# Patient Record
Sex: FEMALE | Race: WHITE | NOT HISPANIC OR LATINO | RURAL
[De-identification: names, ages, dates, MRNs, and addresses within clinical notes are randomized per-mention and may not be internally consistent; named-entity substitution may affect disease eponyms.]

---

## 2024-03-30 ENCOUNTER — HOSPITAL ENCOUNTER (EMERGENCY)
Facility: HOSPITAL | Age: 29
Discharge: HOME | End: 2024-03-30
Attending: EMERGENCY MEDICINE
Payer: COMMERCIAL

## 2024-03-30 VITALS
TEMPERATURE: 98 F | WEIGHT: 160 LBS | BODY MASS INDEX: 28.35 KG/M2 | HEIGHT: 63 IN | DIASTOLIC BLOOD PRESSURE: 76 MMHG | HEART RATE: 84 BPM | OXYGEN SATURATION: 98 % | SYSTOLIC BLOOD PRESSURE: 123 MMHG | RESPIRATION RATE: 12 BRPM

## 2024-03-30 DIAGNOSIS — R55 SYNCOPE: ICD-10-CM

## 2024-03-30 DIAGNOSIS — R53.83 EXHAUSTION: Primary | ICD-10-CM

## 2024-03-30 LAB
ALBUMIN SERPL BCP-MCNC: 4.1 G/DL (ref 3.5–5)
ALBUMIN/GLOB SERPL: 1.3 {RATIO}
ALP SERPL-CCNC: 54 U/L (ref 37–98)
ALT SERPL W P-5'-P-CCNC: 31 U/L (ref 13–56)
AMPHET UR QL SCN: NEGATIVE
ANION GAP SERPL CALCULATED.3IONS-SCNC: 10 MMOL/L (ref 7–16)
AST SERPL W P-5'-P-CCNC: 16 U/L (ref 15–37)
BARBITURATES UR QL SCN: NEGATIVE
BASOPHILS # BLD AUTO: 0.05 K/UL (ref 0–0.2)
BASOPHILS NFR BLD AUTO: 0.5 % (ref 0–1)
BENZODIAZ METAB UR QL SCN: NEGATIVE
BILIRUB SERPL-MCNC: 1.2 MG/DL (ref ?–1.2)
BILIRUB UR QL STRIP: NEGATIVE
BUN SERPL-MCNC: 6 MG/DL (ref 7–18)
BUN/CREAT SERPL: 9 (ref 6–20)
CALCIUM SERPL-MCNC: 9 MG/DL (ref 8.5–10.1)
CANNABINOIDS UR QL SCN: NEGATIVE
CHLORIDE SERPL-SCNC: 108 MMOL/L (ref 98–107)
CLARITY UR: CLEAR
CO2 SERPL-SCNC: 26 MMOL/L (ref 21–32)
COCAINE UR QL SCN: NEGATIVE
COLOR UR: COLORLESS
CREAT SERPL-MCNC: 0.64 MG/DL (ref 0.55–1.02)
D DIMER PPP FEU-MCNC: <0.27 ΜG/ML (ref 0–0.47)
DIFFERENTIAL METHOD BLD: ABNORMAL
EGFR (NO RACE VARIABLE) (RUSH/TITUS): 125 ML/MIN/1.73M2
EOSINOPHIL # BLD AUTO: 0.1 K/UL (ref 0–0.5)
EOSINOPHIL NFR BLD AUTO: 1 % (ref 1–4)
ERYTHROCYTE [DISTWIDTH] IN BLOOD BY AUTOMATED COUNT: 11.9 % (ref 11.5–14.5)
GLOBULIN SER-MCNC: 3.2 G/DL (ref 2–4)
GLUCOSE SERPL-MCNC: 88 MG/DL (ref 70–105)
GLUCOSE SERPL-MCNC: 98 MG/DL (ref 74–106)
GLUCOSE UR STRIP-MCNC: NORMAL MG/DL
HCT VFR BLD AUTO: 42.6 % (ref 38–47)
HGB BLD-MCNC: 14.1 G/DL (ref 12–16)
IMM GRANULOCYTES # BLD AUTO: 0.03 K/UL (ref 0–0.04)
IMM GRANULOCYTES NFR BLD: 0.3 % (ref 0–0.4)
KETONES UR STRIP-SCNC: NEGATIVE MG/DL
LEUKOCYTE ESTERASE UR QL STRIP: ABNORMAL
LYMPHOCYTES # BLD AUTO: 2.58 K/UL (ref 1–4.8)
LYMPHOCYTES NFR BLD AUTO: 25.5 % (ref 27–41)
MAGNESIUM SERPL-MCNC: 2.2 MG/DL (ref 1.7–2.3)
MCH RBC QN AUTO: 30.4 PG (ref 27–31)
MCHC RBC AUTO-ENTMCNC: 33.1 G/DL (ref 32–36)
MCV RBC AUTO: 91.8 FL (ref 80–96)
MONOCYTES # BLD AUTO: 0.51 K/UL (ref 0–0.8)
MONOCYTES NFR BLD AUTO: 5 % (ref 2–6)
MPC BLD CALC-MCNC: 10.6 FL (ref 9.4–12.4)
MUCOUS, UA: ABNORMAL /LPF
NEUTROPHILS # BLD AUTO: 6.83 K/UL (ref 1.8–7.7)
NEUTROPHILS NFR BLD AUTO: 67.7 % (ref 53–65)
NITRITE UR QL STRIP: NEGATIVE
NRBC # BLD AUTO: 0 X10E3/UL
NRBC, AUTO (.00): 0 %
NT-PROBNP SERPL-MCNC: 80 PG/ML (ref 1–125)
OPIATES UR QL SCN: NEGATIVE
PCP UR QL SCN: NEGATIVE
PH UR STRIP: 7 PH UNITS
PLATELET # BLD AUTO: 249 K/UL (ref 150–400)
POTASSIUM SERPL-SCNC: 3.5 MMOL/L (ref 3.5–5.1)
PROT SERPL-MCNC: 7.3 G/DL (ref 6.4–8.2)
PROT UR QL STRIP: NEGATIVE
RBC # BLD AUTO: 4.64 M/UL (ref 4.2–5.4)
RBC # UR STRIP: ABNORMAL /UL
RBC #/AREA URNS HPF: 1 /HPF
SODIUM SERPL-SCNC: 140 MMOL/L (ref 136–145)
SP GR UR STRIP: 1.01
SQUAMOUS #/AREA URNS LPF: ABNORMAL /HPF
TROPONIN I SERPL DL<=0.01 NG/ML-MCNC: <4 PG/ML
UROBILINOGEN UR STRIP-ACNC: NORMAL MG/DL
WBC # BLD AUTO: 10.1 K/UL (ref 4.5–11)
WBC #/AREA URNS HPF: 2 /HPF

## 2024-03-30 PROCEDURE — 85025 COMPLETE CBC W/AUTO DIFF WBC: CPT | Performed by: EMERGENCY MEDICINE

## 2024-03-30 PROCEDURE — 80307 DRUG TEST PRSMV CHEM ANLYZR: CPT | Performed by: EMERGENCY MEDICINE

## 2024-03-30 PROCEDURE — 93010 ELECTROCARDIOGRAM REPORT: CPT | Mod: ,,, | Performed by: HOSPITALIST

## 2024-03-30 PROCEDURE — 82962 GLUCOSE BLOOD TEST: CPT

## 2024-03-30 PROCEDURE — 99285 EMERGENCY DEPT VISIT HI MDM: CPT | Mod: 25

## 2024-03-30 PROCEDURE — 83880 ASSAY OF NATRIURETIC PEPTIDE: CPT | Performed by: EMERGENCY MEDICINE

## 2024-03-30 PROCEDURE — 85379 FIBRIN DEGRADATION QUANT: CPT | Performed by: EMERGENCY MEDICINE

## 2024-03-30 PROCEDURE — 25000003 PHARM REV CODE 250: Performed by: EMERGENCY MEDICINE

## 2024-03-30 PROCEDURE — 81001 URINALYSIS AUTO W/SCOPE: CPT | Mod: XB | Performed by: EMERGENCY MEDICINE

## 2024-03-30 PROCEDURE — 96361 HYDRATE IV INFUSION ADD-ON: CPT

## 2024-03-30 PROCEDURE — 84484 ASSAY OF TROPONIN QUANT: CPT | Performed by: EMERGENCY MEDICINE

## 2024-03-30 PROCEDURE — 80053 COMPREHEN METABOLIC PANEL: CPT | Performed by: EMERGENCY MEDICINE

## 2024-03-30 PROCEDURE — 96360 HYDRATION IV INFUSION INIT: CPT

## 2024-03-30 PROCEDURE — 93005 ELECTROCARDIOGRAM TRACING: CPT

## 2024-03-30 PROCEDURE — 99285 EMERGENCY DEPT VISIT HI MDM: CPT | Mod: ,,, | Performed by: EMERGENCY MEDICINE

## 2024-03-30 PROCEDURE — 83735 ASSAY OF MAGNESIUM: CPT | Performed by: EMERGENCY MEDICINE

## 2024-03-30 RX ADMIN — SODIUM CHLORIDE 1000 ML: 9 INJECTION, SOLUTION INTRAVENOUS at 08:03

## 2024-03-30 NOTE — DISCHARGE INSTRUCTIONS
It is important to get some rest avoid exhaustion    Return the ER if your symptoms worsen or new symptoms develop    Try to increase your sleep and optimize your diet.    Reconsider the intermittent fasting.  You would benefit from eating throughout the day at least for the next month    Ambulatory referral to cardiologist.    Follow up with the primary care doctor for re-evaluation

## 2024-03-30 NOTE — ED PROVIDER NOTES
"Encounter Date: 3/30/2024       History     Chief Complaint   Patient presents with    Hypertension     Patient states Htn at work. No Hx of HTN    Loss of Consciousness     Patient states multiple "blacking out episodes" since Wednesday     Patient complains of lightheadedness and near-syncope.  Patient reports that 3 nights ago she passed out 3 times.  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  Was with her girlfriend at home Wednesday night and had 3 syncopal episodes.  Today is Saturday morning.  Patient says she had been feeling well and was cooking dinner and passed all the way out for 2 or 3 minutes each time.  She had some convulsive like activity but had no tongue biting or urinary incontinence.  She has no history of seizures or family history of seizures patient had been feeling a little lightheaded before those 3 episodes.  Patient has been having some lightheadedness and blurry vision over the past few days and mild generalized headache.  She denies history DVT or PE no shortness of breath.  Patient's suspect her blood pressures been elevated never previously treated for blood pressure.  She does have polycystic ovarian syndrome and is on intermittent fasting diet.  She was not missed a cycle says no way for her to be pregnant is homosexual.  Cycles are not heavy.  She does smoke.      Review of patient's allergies indicates:   Allergen Reactions    Amoxicillin Anaphylaxis    Penicillins Anaphylaxis    Bactrim [sulfamethoxazole-trimethoprim] Other (See Comments)     Fever     Past Medical History:   Diagnosis Date    PCOS (polycystic ovarian syndrome)      History reviewed. No pertinent surgical history.  History reviewed. No pertinent family history.  Social History     Tobacco Use    Smoking status: Every Day     Current packs/day: 0.50     Types: Cigarettes    Smokeless tobacco: Never   Substance Use Topics    Alcohol use: Never    Drug use: Never     Review of Systems   Constitutional: "  Negative for fever.   HENT:  Negative for sore throat.    Respiratory:  Negative for shortness of breath.    Cardiovascular:  Negative for chest pain.   Gastrointestinal:  Negative for nausea.   Genitourinary:  Negative for dysuria.   Musculoskeletal:  Negative for back pain.   Skin:  Negative for rash.   Neurological:  Positive for syncope and light-headedness. Negative for weakness.   Hematological:  Does not bruise/bleed easily.       Physical Exam     Initial Vitals [03/30/24 0735]   BP Pulse Resp Temp SpO2   (!) 151/97 88 18 98.1 °F (36.7 °C) 99 %      MAP       --         Physical Exam    Nursing note and vitals reviewed.  Constitutional: She appears well-developed and well-nourished.   HENT:   Head: Normocephalic and atraumatic.   Eyes: EOM are normal. Pupils are equal, round, and reactive to light.   Neck: Neck supple. No thyromegaly present.   Normal range of motion.  Cardiovascular:  Normal rate, regular rhythm, normal heart sounds and intact distal pulses.           No murmur heard.  Pulmonary/Chest: Breath sounds normal. No respiratory distress. She has no wheezes.   Abdominal: Abdomen is soft. Bowel sounds are normal. She exhibits no distension. There is no abdominal tenderness.   Musculoskeletal:         General: No tenderness or edema. Normal range of motion.      Cervical back: Normal range of motion and neck supple.     Lymphadenopathy:     She has no cervical adenopathy.   Neurological: She is alert and oriented to person, place, and time. She has normal strength. No cranial nerve deficit or sensory deficit. GCS eye subscore is 4. GCS verbal subscore is 5. GCS motor subscore is 6.   Visual fields intact.  Normal finger-nose-finger bilateral   Skin: Skin is warm and dry. No rash noted.   Psychiatric: She has a normal mood and affect.         Medical Screening Exam   See Full Note    ED Course   Procedures  Labs Reviewed   COMPREHENSIVE METABOLIC PANEL - Abnormal; Notable for the following  components:       Result Value    Chloride 108 (*)     BUN 6 (*)     All other components within normal limits   URINALYSIS, REFLEX TO URINE CULTURE - Abnormal; Notable for the following components:    Leukocytes, UA Trace (*)     Blood, UA Small (*)     All other components within normal limits   CBC WITH DIFFERENTIAL - Abnormal; Notable for the following components:    Neutrophils % 67.7 (*)     Lymphocytes % 25.5 (*)     All other components within normal limits   URINALYSIS, MICROSCOPIC - Abnormal; Notable for the following components:    Squamous Epithelial Cells, UA Occasional (*)     Mucous Occasional (*)     All other components within normal limits   MAGNESIUM - Normal   D DIMER, QUANTITATIVE - Normal   NT-PRO NATRIURETIC PEPTIDE - Normal   DRUG SCREEN, URINE (BEAKER) - Normal   TROPONIN I - Normal   CBC W/ AUTO DIFFERENTIAL    Narrative:     The following orders were created for panel order CBC auto differential.  Procedure                               Abnormality         Status                     ---------                               -----------         ------                     CBC with Differential[[id number]]       Abnormal            Final result                 Please view results for these tests on the individual orders.   POCT GLUCOSE MONITORING CONTINUOUS          Imaging Results              CT Head Without Contrast (Final result)  Result time 03/30/24 09:43:24      Final result by Mihir Shaikh MD (03/30/24 09:43:24)                   Impression:      1. No acute intracranial abnormality.    Place of service: [facility]      Electronically signed by: Mihir Shaikh  Date:    03/30/2024  Time:    09:43               Narrative:    EXAMINATION:  CT HEAD WITHOUT CONTRAST    CLINICAL HISTORY:  Seizure, new-onset, no history of trauma;    TECHNIQUE:  Axial CT imaging from the vertex to skull the skull base was performed without contrast. Total DLP: 956 mGy*cm    Dose reduction:    The  CT exam was performed using one or more dose reduction techniques: Automatic exposure control, automated adjustment of the MA and/or kVP according to patient size, or use of iterative reconstruction technique.    COMPARISON:  None.    FINDINGS:  Cortical sulci, ventricles and basilar cisterns are within normal limits in appearance. There is no evidence of hydrocephalus, midline shift or mass effect. Gray and white matter differentiation is preserved. There is no CT evidence of acute intracranial hemorrhage or infarction.    The calvarium is intact. The visualized orbits and globes appear within normal limits. The paranasal sinuses and mastoid air cells are predominantly clear. Scalp soft tissues appear unremarkable.                                       X-Ray Chest 1 View (Final result)  Result time 03/30/24 10:58:03      Final result by Mihir Shaikh MD (03/30/24 10:58:03)                   Impression:      No acute cardiopulmonary process.    Place of service: [facility]      Electronically signed by: Mihir Shaikh  Date:    03/30/2024  Time:    10:58               Narrative:    EXAMINATION:  XR CHEST 1 VIEW    CLINICAL HISTORY:  Syncope and collapse    TECHNIQUE:  Portal    COMPARISON:  None    FINDINGS:  The cardiomediastinal silhouette is within normal limits. The lungs are clear. There is no pneumothorax or pleural effusion.    There is no acute osseous or soft tissue abnormality.                                       Medications   sodium chloride 0.9% bolus 1,000 mL 1,000 mL (0 mLs Intravenous Stopped 3/30/24 1100)     Medical Decision Making  MDM    Patient very exhausted and frustrated.  Got half of her L bag of normal saline in the ER.  Was pretty upset with her 3-1/2 hour ER stay because she would to take her IV whether to the bathroom no nurse was available to help her at that time and she did not get hooked back up to her IV when she got back to the room.  Also she was very  stressed about happy to be back to work at 3:00 a.m. p.o..  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  Was able to have a cordial conversation explained the workup and recommendations but she was indeed very frustrated with the ED visit.  Also talked with her significant other about the syncopal episodes and stressed the importance of following up with cardiology and with primary care and to return the ER if symptoms worsen or new symptoms develop.  Gave her a work note and offered to call her employer to explained in the situation so she gets some rest today.  Advised her to quit the intermittent fasting diet.  Also advised increase fluid intake.  Electrolytes unremarkable BUN over creatinine okay.  Patient did listen and seemed to understand the discharge instructions but she was very stressed about getting out of the ER so she can get 1 hour of sleep before she went back to work at 3 pm        Patient presents for emergent evaluation of acute syncope fatigue that poses a threat to life and/or bodily function.    In the ED patient found to have acute syncope.    I ordered labs and personally reviewed them.  Labs significant for D-dimer negative unlikely to pulmonary embolism.  Troponin negative unlikely to be myocardial infarction.  Creatinine normal not renal insufficiency.  No anemia.  ProBNP normal not indicative of heart strain/CHF  I ordered X-rays and personally reviewed them and reviewed the radiologist interpretation.  Xray significant for chest x-ray no acute abnormality  Unlikely to be epilepsy based on her description of the syncopal episodes.  Talked with her about EEG could be arranged through primary care referring to Neurology.  Also discussed possibility of POTS.    I ordered EKG and personally reviewed it.  EKG significant for no ST elevation no ectopy    Discharge MDM  Patient was managed in the ED with IV normal saline.    The response to treatment  was stable.    Patient was discharged in stable condition.  Detailed return precautions discussed.     As per AVS  It is important to get some rest avoid exhaustion    Return the ER if your symptoms worsen or new symptoms develop    Try to increase your sleep and optimize your diet.    Reconsider the intermittent fasting.  You would benefit from eating throughout the day at least for the next month    Ambulatory referral to cardiologist.    Follow up with the primary care doctor for re-evaluation    Amount and/or Complexity of Data Reviewed  Labs: ordered.  Radiology: ordered.                                      Clinical Impression:   Final diagnoses:  [R55] Syncope  [R53.83] Exhaustion (Primary)        ED Disposition Condition    Discharge Stable          ED Prescriptions    None       Follow-up Information       Follow up With Specialties Details Why Contact Info    Negrito Rasmussen MD Interventional Cardiology, Cardiology Schedule an appointment as soon as possible for a visit   [address] [place] [id number]  [phone]      [facility] - Emergency Department Emergency Medicine Go to  As needed, If symptoms worsen [address] [place], [place] [id number] [phone]    [facility] - Emergency Department Emergency Medicine Go to  As needed, If symptoms worsen [address] [place], [place] [address] [phone]             Mihir Garner MD  03/30/24 [id number]

## 2024-03-30 NOTE — Clinical Note
"Sandhya"Carrie Liriano was seen and treated in our emergency department on 3/30/2024.  She may return to work on 03/31/2024.       If you have any questions or concerns, please don't hesitate to call.      Mihir Garner MD"

## 2024-03-31 LAB
OHS QRS DURATION: 88 MS
OHS QTC CALCULATION: 387 MS

## 2025-04-08 ENCOUNTER — OFFICE VISIT (OUTPATIENT)
Dept: OBSTETRICS AND GYNECOLOGY | Facility: CLINIC | Age: 30
End: 2025-04-08
Payer: COMMERCIAL

## 2025-04-08 VITALS
SYSTOLIC BLOOD PRESSURE: 107 MMHG | DIASTOLIC BLOOD PRESSURE: 69 MMHG | HEART RATE: 71 BPM | WEIGHT: 178 LBS | BODY MASS INDEX: 31.53 KG/M2

## 2025-04-08 DIAGNOSIS — R10.2 PELVIC PAIN: ICD-10-CM

## 2025-04-08 DIAGNOSIS — Z87.42 HISTORY OF OVARIAN CYST: ICD-10-CM

## 2025-04-08 DIAGNOSIS — Z87.42 HISTORY OF PCOS: Primary | ICD-10-CM

## 2025-04-08 DIAGNOSIS — R73.03 PREDIABETES: ICD-10-CM

## 2025-04-08 DIAGNOSIS — L68.0 HIRSUTISM: ICD-10-CM

## 2025-04-08 DIAGNOSIS — N92.1 MENORRHAGIA WITH IRREGULAR CYCLE: ICD-10-CM

## 2025-04-08 LAB
EST. AVERAGE GLUCOSE BLD GHB EST-MCNC: 100 MG/DL
HBA1C MFR BLD HPLC: 5.1 %

## 2025-04-08 PROCEDURE — 1159F MED LIST DOCD IN RCRD: CPT | Mod: CPTII,,, | Performed by: OBSTETRICS & GYNECOLOGY

## 2025-04-08 PROCEDURE — 3074F SYST BP LT 130 MM HG: CPT | Mod: CPTII,,, | Performed by: OBSTETRICS & GYNECOLOGY

## 2025-04-08 PROCEDURE — 3044F HG A1C LEVEL LT 7.0%: CPT | Mod: CPTII,,, | Performed by: OBSTETRICS & GYNECOLOGY

## 2025-04-08 PROCEDURE — 99204 OFFICE O/P NEW MOD 45 MIN: CPT | Mod: ,,, | Performed by: OBSTETRICS & GYNECOLOGY

## 2025-04-08 PROCEDURE — 3078F DIAST BP <80 MM HG: CPT | Mod: CPTII,,, | Performed by: OBSTETRICS & GYNECOLOGY

## 2025-04-08 PROCEDURE — 3008F BODY MASS INDEX DOCD: CPT | Mod: CPTII,,, | Performed by: OBSTETRICS & GYNECOLOGY

## 2025-04-08 PROCEDURE — 83036 HEMOGLOBIN GLYCOSYLATED A1C: CPT | Mod: ,,, | Performed by: CLINICAL MEDICAL LABORATORY

## 2025-04-08 PROCEDURE — 36415 COLL VENOUS BLD VENIPUNCTURE: CPT | Mod: ,,, | Performed by: OBSTETRICS & GYNECOLOGY

## 2025-04-08 RX ORDER — SPIRONOLACTONE 25 MG/1
25 TABLET ORAL DAILY
Qty: 30 TABLET | Refills: 11 | Status: SHIPPED | OUTPATIENT
Start: 2025-04-08 | End: 2026-04-08

## 2025-04-08 NOTE — PROGRESS NOTES
History & Physical    SUBJECTIVE:     History of Present Illness:  Patient is a 29 y.o. female presents with PCOS for over 7 years.  Pt states she has been having missed periods, heavy periods, pain during orgasms, and facial hair growth.  Pt states she manages her PCOS with herbal supplements.  Pt states she needs help losing weight.  Pt is being prescribed Aldactone for facial hair growth.  Chief Complaint   Patient presents with    PCOS     Pt reports she is trying to get on the Mounjaro injections for her PCOS.        Review of patient's allergies indicates:   Allergen Reactions    Amoxicillin Anaphylaxis    Penicillins Anaphylaxis    Bactrim [sulfamethoxazole-trimethoprim] Other (See Comments)     Fever       Current Medications[1]    Past Medical History:   Diagnosis Date    Amenorrhea 7 years ago    Hypertension 7 years ago    Infertility, female 4 years ago    PCOS (polycystic ovarian syndrome)      History reviewed. No pertinent surgical history.  Family History   Problem Relation Name Age of Onset    Migraines Mother Eden Llanos     Migraines Maternal Grandmother Elsa Myrick     Breast cancer Paternal Grandmother Fabienne Ehri     Diabetes Paternal Grandmother Fabienne Liriano     Hypertension Paternal Grandmother Fabienne Liriano     Migraines Sister Tessy Liriano     Rashes / Skin problems Maternal Uncle Efrain London     Rashes / Skin problems Brother Sreedhar Soriano      Social History[2]     Review of Systems:  Review of Systems   Constitutional:  Negative for appetite change, chills, fatigue and fever.   HENT: Negative.     Eyes: Negative.    Respiratory:  Negative for cough, chest tightness and shortness of breath.    Cardiovascular:  Negative for chest pain, palpitations and leg swelling.   Gastrointestinal:  Positive for abdominal pain and diarrhea. Negative for abdominal distention, blood in stool, constipation, nausea and vomiting.   Endocrine: Negative for cold intolerance, heat intolerance,  polydipsia, polyphagia and polyuria.   Genitourinary:  Positive for flank pain and pelvic pain. Negative for difficulty urinating, dyspareunia, dysuria, frequency, hematuria, urgency, vaginal bleeding, vaginal discharge and vaginal pain.   Musculoskeletal:  Positive for back pain.   Skin: Negative.  Negative for rash.   Neurological:  Positive for headaches.   Psychiatric/Behavioral: Negative.  Negative for agitation, behavioral problems, confusion and sleep disturbance. The patient is not nervous/anxious.        OBJECTIVE:     Vital Signs (Most Recent)  Pulse: 71 (04/08/25 1455)  BP: 107/69 (04/08/25 1455)     80.7 kg (178 lb)     Physical Exam:  Physical Exam  Vitals reviewed. Exam conducted with a chaperone present.   Constitutional:       Appearance: Normal appearance.   HENT:      Head: Normocephalic and atraumatic.      Mouth/Throat:      Mouth: Mucous membranes are moist.   Eyes:      Extraocular Movements: Extraocular movements intact.      Pupils: Pupils are equal, round, and reactive to light.   Cardiovascular:      Rate and Rhythm: Normal rate and regular rhythm.      Pulses: Normal pulses.      Heart sounds: Normal heart sounds.   Pulmonary:      Effort: Pulmonary effort is normal.      Breath sounds: Normal breath sounds.   Abdominal:      General: Abdomen is flat. Bowel sounds are normal.      Palpations: Abdomen is soft.   Musculoskeletal:         General: Normal range of motion.      Cervical back: Normal range of motion.   Skin:     General: Skin is warm and dry.   Neurological:      General: No focal deficit present.      Mental Status: She is alert and oriented to person, place, and time.   Psychiatric:         Mood and Affect: Mood normal.         Behavior: Behavior normal.         Thought Content: Thought content normal.         Judgment: Judgment normal.             ASSESSMENT/PLAN:   Sandhya was seen today for pcos.    Diagnoses and all orders for this visit:    History of PCOS  -      spironolactone (ALDACTONE) 25 MG tablet; Take 1 tablet (25 mg total) by mouth once daily.    Menorrhagia with irregular cycle    Hirsutism    History of ovarian cyst    Pelvic pain    Prediabetes  -     Hemoglobin A1C; Future  -     Hemoglobin A1C      There are no hospital problems to display for this patient.      PLAN:Plan   Rtc in 3 weeks for f/u.              [1]   Current Outpatient Medications   Medication Sig Dispense Refill    spironolactone (ALDACTONE) 25 MG tablet Take 1 tablet (25 mg total) by mouth once daily. 30 tablet 11     No current facility-administered medications for this visit.   [2]   Social History  Tobacco Use    Smoking status: Former     Current packs/day: 2.00     Average packs/day: 2.0 packs/day for 15.0 years (30.0 ttl pk-yrs)     Types: Cigarettes, Vaping w/o nicotine    Smokeless tobacco: Never   Substance Use Topics    Alcohol use: Yes     Alcohol/week: 1.0 standard drink of alcohol     Types: 1 Glasses of wine per week    Drug use: Never

## 2025-06-23 ENCOUNTER — OFFICE VISIT (OUTPATIENT)
Dept: FAMILY MEDICINE | Facility: CLINIC | Age: 30
End: 2025-06-23
Payer: COMMERCIAL

## 2025-06-23 VITALS
HEIGHT: 63 IN | DIASTOLIC BLOOD PRESSURE: 80 MMHG | WEIGHT: 184 LBS | HEART RATE: 83 BPM | OXYGEN SATURATION: 97 % | SYSTOLIC BLOOD PRESSURE: 124 MMHG | BODY MASS INDEX: 32.6 KG/M2 | RESPIRATION RATE: 20 BRPM | TEMPERATURE: 98 F

## 2025-06-23 DIAGNOSIS — L30.9 ECZEMA, UNSPECIFIED TYPE: Primary | ICD-10-CM

## 2025-06-23 PROBLEM — E28.2 POLYCYSTIC OVARY SYNDROME: Status: ACTIVE | Noted: 2021-09-27

## 2025-06-23 PROCEDURE — 1160F RVW MEDS BY RX/DR IN RCRD: CPT | Mod: CPTII,,,

## 2025-06-23 PROCEDURE — 99203 OFFICE O/P NEW LOW 30 MIN: CPT | Mod: ,,,

## 2025-06-23 PROCEDURE — 3074F SYST BP LT 130 MM HG: CPT | Mod: CPTII,,,

## 2025-06-23 PROCEDURE — 3079F DIAST BP 80-89 MM HG: CPT | Mod: CPTII,,,

## 2025-06-23 PROCEDURE — 3008F BODY MASS INDEX DOCD: CPT | Mod: CPTII,,,

## 2025-06-23 PROCEDURE — 3044F HG A1C LEVEL LT 7.0%: CPT | Mod: CPTII,,,

## 2025-06-23 PROCEDURE — 1159F MED LIST DOCD IN RCRD: CPT | Mod: CPTII,,,

## 2025-06-23 RX ORDER — TRIAMCINOLONE ACETONIDE 1 MG/G
OINTMENT TOPICAL 2 TIMES DAILY
Qty: 80 G | Refills: 1 | Status: SHIPPED | OUTPATIENT
Start: 2025-06-23

## 2025-06-23 RX ORDER — CETIRIZINE HYDROCHLORIDE 10 MG/1
10 TABLET ORAL DAILY
Qty: 30 TABLET | Refills: 1 | Status: SHIPPED | OUTPATIENT
Start: 2025-06-23 | End: 2025-08-22

## 2025-06-23 NOTE — LETTER
June 23, 2025      [facility]  [address]  [place] [address]  Phone: [phone]  Fax: [phone]       Patient: Sandhya Liriano   YOB: 1995  Date of Visit: 06/23/2025    To Whom It May Concern:    Isabella Liriano  was at [facility] on 06/23/2025. The patient may return to work/school on 6/24/2025 with no restrictions. If you have any questions or concerns, or if I can be of further assistance, please do not hesitate to contact me.    Sincerely,    RIGOBERTO Salter

## 2025-07-01 ENCOUNTER — OFFICE VISIT (OUTPATIENT)
Dept: FAMILY MEDICINE | Facility: CLINIC | Age: 30
End: 2025-07-01
Payer: COMMERCIAL

## 2025-07-01 VITALS
OXYGEN SATURATION: 98 % | RESPIRATION RATE: 20 BRPM | HEART RATE: 82 BPM | SYSTOLIC BLOOD PRESSURE: 109 MMHG | TEMPERATURE: 98 F | DIASTOLIC BLOOD PRESSURE: 76 MMHG | WEIGHT: 182 LBS | HEIGHT: 63 IN | BODY MASS INDEX: 32.25 KG/M2

## 2025-07-01 DIAGNOSIS — Z76.89 ENCOUNTER TO ESTABLISH CARE WITH NEW DOCTOR: ICD-10-CM

## 2025-07-01 DIAGNOSIS — Z71.9 ENCOUNTER FOR HEALTH EDUCATION: ICD-10-CM

## 2025-07-01 DIAGNOSIS — E66.9 OBESITY (BMI 30-39.9): ICD-10-CM

## 2025-07-01 DIAGNOSIS — E28.2 POLYCYSTIC OVARY SYNDROME: Primary | ICD-10-CM

## 2025-07-01 LAB
ALBUMIN SERPL BCP-MCNC: 3.9 G/DL (ref 3.5–5)
ALBUMIN/GLOB SERPL: 1.2 {RATIO}
ALP SERPL-CCNC: 52 U/L (ref 40–150)
ALT SERPL W P-5'-P-CCNC: 24 U/L
ANION GAP SERPL CALCULATED.3IONS-SCNC: 8 MMOL/L (ref 7–16)
AST SERPL W P-5'-P-CCNC: 17 U/L (ref 11–45)
BASOPHILS # BLD AUTO: 0.04 K/UL (ref 0–0.2)
BASOPHILS NFR BLD AUTO: 0.4 % (ref 0–1)
BILIRUB SERPL-MCNC: 0.4 MG/DL
BUN SERPL-MCNC: 10 MG/DL (ref 7–19)
BUN/CREAT SERPL: 16 (ref 6–20)
CALCIUM SERPL-MCNC: 8.7 MG/DL (ref 8.4–10.2)
CHLORIDE SERPL-SCNC: 110 MMOL/L (ref 98–107)
CHOLEST SERPL-MCNC: 135 MG/DL
CHOLEST/HDLC SERPL: 3.5 {RATIO}
CO2 SERPL-SCNC: 25 MMOL/L (ref 22–29)
CREAT SERPL-MCNC: 0.62 MG/DL (ref 0.55–1.02)
DIFFERENTIAL METHOD BLD: ABNORMAL
EGFR (NO RACE VARIABLE) (RUSH/TITUS): 125 ML/MIN/1.73M2
EOSINOPHIL # BLD AUTO: 0.04 K/UL (ref 0–0.5)
EOSINOPHIL NFR BLD AUTO: 0.4 % (ref 1–4)
ERYTHROCYTE [DISTWIDTH] IN BLOOD BY AUTOMATED COUNT: 12.1 % (ref 11.5–14.5)
GLOBULIN SER-MCNC: 3.3 G/DL (ref 2–4)
GLUCOSE SERPL-MCNC: 78 MG/DL (ref 74–100)
HCT VFR BLD AUTO: 40.9 % (ref 38–47)
HDLC SERPL-MCNC: 39 MG/DL (ref 35–60)
HGB BLD-MCNC: 13.1 G/DL (ref 12–16)
IMM GRANULOCYTES # BLD AUTO: 0.03 K/UL (ref 0–0.04)
IMM GRANULOCYTES NFR BLD: 0.3 % (ref 0–0.4)
LDLC SERPL CALC-MCNC: 71 MG/DL
LDLC/HDLC SERPL: 1.8 {RATIO}
LYMPHOCYTES # BLD AUTO: 2.17 K/UL (ref 1–4.8)
LYMPHOCYTES NFR BLD AUTO: 22.4 % (ref 27–41)
MCH RBC QN AUTO: 29.8 PG (ref 27–31)
MCHC RBC AUTO-ENTMCNC: 32 G/DL (ref 32–36)
MCV RBC AUTO: 93.2 FL (ref 80–96)
MONOCYTES # BLD AUTO: 0.46 K/UL (ref 0–0.8)
MONOCYTES NFR BLD AUTO: 4.7 % (ref 2–6)
MPC BLD CALC-MCNC: 10.9 FL (ref 9.4–12.4)
NEUTROPHILS # BLD AUTO: 6.96 K/UL (ref 1.8–7.7)
NEUTROPHILS NFR BLD AUTO: 71.8 % (ref 53–65)
NONHDLC SERPL-MCNC: 96 MG/DL
NRBC # BLD AUTO: 0 X10E3/UL
NRBC, AUTO (.00): 0 %
PLATELET # BLD AUTO: 248 K/UL (ref 150–400)
POTASSIUM SERPL-SCNC: 4.3 MMOL/L (ref 3.5–5.1)
PROT SERPL-MCNC: 7.2 G/DL (ref 6.4–8.3)
RBC # BLD AUTO: 4.39 M/UL (ref 4.2–5.4)
SODIUM SERPL-SCNC: 139 MMOL/L (ref 136–145)
TRIGL SERPL-MCNC: 126 MG/DL (ref 37–140)
TSH SERPL DL<=0.005 MIU/L-ACNC: 1.72 UIU/ML (ref 0.35–4.94)
VLDLC SERPL-MCNC: 25 MG/DL
WBC # BLD AUTO: 9.7 K/UL (ref 4.5–11)

## 2025-07-01 PROCEDURE — 99214 OFFICE O/P EST MOD 30 MIN: CPT | Mod: GC,,, | Performed by: FAMILY MEDICINE

## 2025-07-01 PROCEDURE — 3008F BODY MASS INDEX DOCD: CPT | Mod: CPTII,,, | Performed by: FAMILY MEDICINE

## 2025-07-01 PROCEDURE — 80050 GENERAL HEALTH PANEL: CPT | Mod: ,,, | Performed by: CLINICAL MEDICAL LABORATORY

## 2025-07-01 PROCEDURE — 1159F MED LIST DOCD IN RCRD: CPT | Mod: CPTII,,, | Performed by: FAMILY MEDICINE

## 2025-07-01 PROCEDURE — 3074F SYST BP LT 130 MM HG: CPT | Mod: CPTII,,, | Performed by: FAMILY MEDICINE

## 2025-07-01 PROCEDURE — 3078F DIAST BP <80 MM HG: CPT | Mod: CPTII,,, | Performed by: FAMILY MEDICINE

## 2025-07-01 PROCEDURE — 3044F HG A1C LEVEL LT 7.0%: CPT | Mod: CPTII,,, | Performed by: FAMILY MEDICINE

## 2025-07-01 PROCEDURE — 80061 LIPID PANEL: CPT | Mod: ,,, | Performed by: CLINICAL MEDICAL LABORATORY

## 2025-07-01 RX ORDER — LIRAGLUTIDE 6 MG/ML
0.6 INJECTION SUBCUTANEOUS DAILY
Qty: 3 ML | Refills: 11 | Status: SHIPPED | OUTPATIENT
Start: 2025-07-01 | End: 2025-07-01

## 2025-07-01 RX ORDER — LIRAGLUTIDE 6 MG/ML
0.6 INJECTION SUBCUTANEOUS DAILY
Qty: 3 ML | Refills: 0 | Status: SHIPPED | OUTPATIENT
Start: 2025-07-01 | End: 2026-07-01

## 2025-07-01 NOTE — PROGRESS NOTES
Loli Garcia MD        PATIENT NAME: Sandhya Liriano  : 1995  DATE: 25  MRN: [id number]      Billing Provider: Loli Garcia MD  Level of Service:   Patient PCP Information       Provider PCP Type    Loli Garcia MD General            Reason for Visit / Chief Complaint: Establish Care       Update PCP  Update Chief Complaint         History of Present Illness / Problem Focused Workflow     Sandhya Liriano presents to the clinic with Establish Care     The patient is a 29-year-old female who presents with concerns regarding medical weight loss management. She has a history of Polycystic Ovary Syndrome (PCOS), diagnosed approximately 5-6 years ago, which she reports has been difficult to manage. The patient previously attempted to obtain weight loss medication, including Mounjaro (tirzepatide), while living in [place], but was unable to continue due to relocation to [place] and difficulties with access.  She reports that her blood sugar levels are within normal limits, and she recently had an A1C test, which was also normal. The patient follows a clean diet and works as a , often on her feet for 12-hour shifts. Despite these efforts, she continues to struggle with weight loss and is seeking assistance, although insurance barriers have complicated access to medications.  The patient also reports a flare-up of eczema on her legs, which has been bothersome enough to prevent shaving due to irritation and discomfort. She is requesting evaluation and management for both her dermatologic and weight concerns, particularly in the context of PCOS.          Review of Systems     Review of Systems   Constitutional:  Negative for activity change, fatigue and fever.   Gastrointestinal:  Negative for abdominal pain and constipation.   Genitourinary:  Negative for dyspareunia, dysuria, flank pain, pelvic pain and urgency.   Musculoskeletal:  Negative for back pain.        Medical /  Social / Family History     Past Medical History:   Diagnosis Date    Amenorrhea 7 years ago    Hypertension 7 years ago    Infertility, female 4 years ago    PCOS (polycystic ovarian syndrome)        No past surgical history on file.    Social History  Ms.  reports that she has quit smoking. Her smoking use included cigarettes. She has a 30 pack-year smoking history. She has been exposed to tobacco smoke. She has never used smokeless tobacco. She reports current alcohol use of about 1.0 standard drink of alcohol per week. She reports that she does not use drugs.    Family History  Ms.'s family history includes Breast cancer in her paternal grandmother; Diabetes in her paternal grandmother; Hypertension in her paternal grandmother; Migraines in her maternal grandmother, mother, and sister; Rashes / Skin problems in her brother and maternal uncle.    Medications and Allergies     Medications  Outpatient Medications Marked as Taking for the 7/1/25 encounter (Office Visit) with Loli Garcia MD   Medication Sig Dispense Refill    cetirizine (ZYRTEC) 10 MG tablet Take 1 tablet (10 mg total) by mouth once daily. 30 tablet 1    triamcinolone acetonide 0.1% (KENALOG) 0.1 % ointment Apply topically 2 (two) times daily. 80 g 1       Allergies  Review of patient's allergies indicates:   Allergen Reactions    Amoxicillin Anaphylaxis    Penicillins Anaphylaxis    Bactrim [sulfamethoxazole-trimethoprim] Other (See Comments)     Fever       Physical Examination     Vitals:    07/01/25 1443   BP: 109/76   Pulse: 82   Resp: 20   Temp: 98.1 °F (36.7 °C)     Physical Exam  Constitutional:       Appearance: Normal appearance. She is obese.   HENT:      Mouth/Throat:      Mouth: Mucous membranes are moist.   Cardiovascular:      Rate and Rhythm: Normal rate and regular rhythm.      Pulses: Normal pulses.      Heart sounds: Normal heart sounds. No murmur heard.  Pulmonary:      Effort: Pulmonary effort is normal.      Breath sounds:  Normal breath sounds.   Abdominal:      General: There is no distension.      Tenderness: There is no abdominal tenderness. There is no guarding.   Skin:     Coloration: Skin is not jaundiced or pale.   Neurological:      General: No focal deficit present.      Mental Status: She is alert.   Psychiatric:         Mood and Affect: Mood normal.         Behavior: Behavior normal.          Assessment and Plan (including Health Maintenance)      Problem List  Smart Sets  Document Outside HM   :  Assessment:  Obesity associated with PCOS (E66.9, E28.2)  The patient presents with a long-standing history of PCOS and related weight challenges. Despite adherence to a clean diet and physically demanding job, she has had limited success with weight loss. She is seeking medical assistance and has previously attempted weight loss medication without continued access due to relocation and insurance limitations.  Eczema flare (L30.9)  Eczema localized to the lower extremities, described as bothersome and limiting hygiene (e.g., shaving). No signs of infection reported at this time.    Plan:  Medical Weight Management:  Started Liraglutide (Victoza) 0.6 mg/0.1 mL subcutaneously once daily.  Dispense: 3 mL pen injector; No refills at this time.  Patient Education:  Reviewed injection technique, proper storage, and importance of taking the medication at the same time each day.  Advised that nausea, decreased appetite, constipation, and fatigue are common and usually subside over time.  Rare but serious risks include pancreatitis, gallbladder disease, and allergic reactions.  Emergency Protocol:  Patient instructed to seek immediate medical attention if experiencing:  Severe abdominal pain (especially radiating to the back)  Persistent vomiting  Signs of an allergic reaction (rash, swelling of face/lips/tongue, difficulty breathing)  Fainting, chest pain, or severe dizziness  Advised to call 911 or go to the nearest emergency room if any  of the above symptoms occur.  Laboratory Workup (Scheduled for 07/01/2025):  CBC with Auto Differential  Comprehensive Metabolic Panel (CMP)  Thyroid Stimulating Hormone (TSH)  Lipid Panel  Lifestyle & Nutrition Counseling:  Encouraged continuation of clean eating habits.  Reviewed importance of portion control, meal planning, and consistent physical activity (target 150 minutes/week).  Offered optional referral to a registered dietitian for individualized support.  Advised using a food or habit tracker to monitor behavior patterns and progress.  Skin Care - Eczema Management:  Recommended gentle cleansers and daily use of fragrance-free emollients.  Avoid shaving until irritation improves.  If no improvement or worsening in one week, consider prescribing low- to medium-potency topical corticosteroid.  Follow-Up:  Return visit in 1 month to monitor response to liraglutide, review lab results, assess weight loss progress, and reevaluate skin condition.  Advised to contact clinic sooner if experiencing adverse effects, medication intolerance, or worsening of eczema.        Health Maintenance Due   Topic Date Due    Hepatitis C Screening  Never done    Lipid Panel  Never done    HIV Screening  Never done    Pap Smear  Never done    COVID-19 Vaccine (1 - 2024-25 season) Never done    TETANUS VACCINE  05/21/2025          Health Maintenance Topics with due status: Not Due       Topic Last Completion Date    Influenza Vaccine Not Due    RSV Vaccine (Age 60+ and Pregnant patients) Not Due       Future Appointments   Date Time Provider Department Center   10/20/2025  3:00 PM Evelia Acuña MD [facility] D[facility] [place]        There are no Patient Instructions on file for this visit.  No follow-ups on file.     Signature:  Loli Garcia MD      Date of encounter: 7/1/25

## 2025-07-02 NOTE — PROGRESS NOTES
Assessment:  Obesity associated with PCOS (E66.9, E28.2)  The patient presents with a long-standing history of PCOS and related weight challenges.   Despite adherence to a clean diet and physically demanding job, she has had limited success with weight loss.   She is seeking medical assistance and has previously attempted weight loss medication without continued access due to relocation and insurance limitations.  Eczema flare (L30.9)  Eczema localized to the lower extremities, described as bothersome and limiting hygiene (e.g., shaving).   No signs of infection  at this time.     Plan:  Medical Weight Management:  Started Liraglutide (Victoza) 0.6 mg/0.1 mL subcutaneously once daily.  Dispense: 3 mL pen injector; No refills at this time.  Patient Education:  Reviewed injection technique, proper storage, and importance of taking the medication at the same time each day.  Advised that nausea, decreased appetite, constipation, and fatigue are common and usually subside over time.  Rare but serious risks include pancreatitis, gallbladder disease, and allergic reactions.  Emergency Protocol:  Patient instructed to seek immediate medical attention if experiencing:  Severe abdominal pain (especially radiating to the back)  Persistent vomiting  Signs of an allergic reaction (rash, swelling of face/lips/tongue, difficulty breathing)  Fainting, chest pain, or severe dizziness  Advised to call 911 or go to the nearest emergency room if any of the above symptoms occur.  Laboratory Workup (Scheduled for 07/01/2025):  CBC with Auto Differential  Comprehensive Metabolic Panel (CMP)  Thyroid Stimulating Hormone (TSH)  Lipid Panel  Lifestyle & Nutrition Counseling:  Encouraged continuation of clean eating habits.  Reviewed importance of portion control, meal planning, and consistent physical activity (target 150 minutes/week).  Offered optional referral to a registered dietitian for individualized support.  Advised using a food or  habit tracker to monitor behavior patterns and progress.  Skin Care - Eczema Management:  Recommended gentle cleansers and daily use of fragrance-free emollients.  Avoid shaving until irritation improves.  If no improvement or worsening in one week, consider prescribing low- to medium-potency topical corticosteroid.  Follow-Up:  Return visit in 1 month to monitor response to liraglutide, review lab results, assess weight loss progress, and reevaluate skin condition.  Advised to contact clinic sooner if experiencing adverse effects, medication intolerance, or worsening of eczema.                Health Maintenance Due   Topic Date Due    Hepatitis C Screening  Never done    Lipid Panel  Never done    HIV Screening  Never done    Pap Smear  Never done    COVID-19 Vaccine (1 - 2024-25 season) Never done    TETANUS VACCINE  05/21/2025                 Health Maintenance Topics with due status: Not Due         Topic Last Completion Date     Influenza Vaccine Not Due     RSV Vaccine (Age 60+ and Pregnant patients) Not Due                Future Appointments   Date Time Provider Department Center   10/20/2025  3:00 PM Evelia Acuña MD [facility] DERM [place]              Loli Garcia MD        Date of encounter: 7/1/25     I was present and agreed with  assessment and plan.  Return visit in 1 month to monitor response to liraglutide, review lab results, assess weight loss progress, and reevaluate skin condition.  Jesica Andrea D.O.